# Patient Record
Sex: FEMALE
[De-identification: names, ages, dates, MRNs, and addresses within clinical notes are randomized per-mention and may not be internally consistent; named-entity substitution may affect disease eponyms.]

---

## 2020-03-12 ENCOUNTER — HOSPITAL ENCOUNTER (EMERGENCY)
Dept: HOSPITAL 88 - ER | Age: 25
Discharge: HOME | End: 2020-03-12
Payer: COMMERCIAL

## 2020-03-12 VITALS — DIASTOLIC BLOOD PRESSURE: 68 MMHG | SYSTOLIC BLOOD PRESSURE: 104 MMHG

## 2020-03-12 VITALS — WEIGHT: 140 LBS | BODY MASS INDEX: 26.43 KG/M2 | HEIGHT: 61 IN

## 2020-03-12 DIAGNOSIS — R10.30: Primary | ICD-10-CM

## 2020-03-12 DIAGNOSIS — R19.7: ICD-10-CM

## 2020-03-12 DIAGNOSIS — R11.2: ICD-10-CM

## 2020-03-12 LAB
ALBUMIN SERPL-MCNC: 4.1 G/DL (ref 3.5–5)
ALBUMIN/GLOB SERPL: 1.2 {RATIO} (ref 0.8–2)
ALP SERPL-CCNC: 113 IU/L (ref 40–150)
ALT SERPL-CCNC: 19 IU/L (ref 0–55)
ANION GAP SERPL CALC-SCNC: 10.6 MMOL/L (ref 8–16)
BACTERIA URNS QL MICRO: (no result) /HPF
BASOPHILS # BLD AUTO: 0.1 10*3/UL (ref 0–0.1)
BASOPHILS NFR BLD AUTO: 0.4 % (ref 0–1)
BILIRUB UR QL: NEGATIVE
BUN SERPL-MCNC: 10 MG/DL (ref 7–26)
BUN/CREAT SERPL: 14 (ref 6–25)
CALCIUM SERPL-MCNC: 9.2 MG/DL (ref 8.4–10.2)
CHLORIDE SERPL-SCNC: 106 MMOL/L (ref 98–107)
CLARITY UR: (no result)
CO2 SERPL-SCNC: 24 MMOL/L (ref 22–29)
COLOR UR: YELLOW
DEPRECATED NEUTROPHILS # BLD AUTO: 8.8 10*3/UL (ref 2.1–6.9)
DEPRECATED RBC URNS MANUAL-ACNC: (no result) /HPF (ref 0–5)
EGFRCR SERPLBLD CKD-EPI 2021: > 60 ML/MIN (ref 60–?)
EOSINOPHIL # BLD AUTO: 0.5 10*3/UL (ref 0–0.4)
EOSINOPHIL NFR BLD AUTO: 3.7 % (ref 0–6)
EPI CELLS URNS QL MICRO: (no result) /LPF
ERYTHROCYTE [DISTWIDTH] IN CORD BLOOD: 13.7 % (ref 11.7–14.4)
GLOBULIN PLAS-MCNC: 3.3 G/DL (ref 2.3–3.5)
GLUCOSE SERPLBLD-MCNC: 100 MG/DL (ref 74–118)
HCG UR QL: NEGATIVE
HCT VFR BLD AUTO: 39 % (ref 34.2–44.1)
HGB BLD-MCNC: 12.8 G/DL (ref 12–16)
KETONES UR QL STRIP.AUTO: NEGATIVE
LEUKOCYTE ESTERASE UR QL STRIP.AUTO: NEGATIVE
LIPASE SERPL-CCNC: 11 U/L (ref 8–78)
LYMPHOCYTES # BLD: 3.1 10*3/UL (ref 1–3.2)
LYMPHOCYTES NFR BLD AUTO: 23.5 % (ref 18–39.1)
MCH RBC QN AUTO: 29.2 PG (ref 28–32)
MCHC RBC AUTO-ENTMCNC: 32.8 G/DL (ref 31–35)
MCV RBC AUTO: 88.8 FL (ref 81–99)
MONOCYTES # BLD AUTO: 0.8 10*3/UL (ref 0.2–0.8)
MONOCYTES NFR BLD AUTO: 5.7 % (ref 4.4–11.3)
NEUTS SEG NFR BLD AUTO: 66.2 % (ref 38.7–80)
NITRITE UR QL STRIP.AUTO: NEGATIVE
PLATELET # BLD AUTO: 458 X10E3/UL (ref 140–360)
POTASSIUM SERPL-SCNC: 3.6 MMOL/L (ref 3.5–5.1)
PROT UR QL STRIP.AUTO: NEGATIVE
RBC # BLD AUTO: 4.39 X10E6/UL (ref 3.6–5.1)
SODIUM SERPL-SCNC: 137 MMOL/L (ref 136–145)
SP GR UR STRIP: 1.03 (ref 1.01–1.02)
UROBILINOGEN UR STRIP-MCNC: 0.2 MG/DL (ref 0.2–1)
WBC #/AREA URNS HPF: (no result) /HPF (ref 0–5)

## 2020-03-12 PROCEDURE — 83690 ASSAY OF LIPASE: CPT

## 2020-03-12 PROCEDURE — 36415 COLL VENOUS BLD VENIPUNCTURE: CPT

## 2020-03-12 PROCEDURE — 81025 URINE PREGNANCY TEST: CPT

## 2020-03-12 PROCEDURE — 99284 EMERGENCY DEPT VISIT MOD MDM: CPT

## 2020-03-12 PROCEDURE — 85025 COMPLETE CBC W/AUTO DIFF WBC: CPT

## 2020-03-12 PROCEDURE — 81001 URINALYSIS AUTO W/SCOPE: CPT

## 2020-03-12 PROCEDURE — 80053 COMPREHEN METABOLIC PANEL: CPT

## 2020-03-12 PROCEDURE — 74177 CT ABD & PELVIS W/CONTRAST: CPT

## 2020-03-12 NOTE — XMS REPORT
Patient Summary Document

                             Created on: 2020



GISSEL CONTEH

External Reference #: [id number]

: 1995

Sex: Female



Demographics







                          Address                   [address] [place] [address] [place]  [address]

 

                          Home Phone                [phone]

 

                          Preferred Language        Unknown

 

                          Marital Status            Unknown

 

                          Congregational Affiliation     Unknown

 

                          Race                      Unknown

 

                                        Additional Race(s)  

 

                          Ethnic Group              Unknown





Author







                          [facility]                    [facility]

 

                          [facility]

 

                          Address                   Unknown

 

                          Phone                     Unavailable







Support







                Name            Relationship    Address         Phone

 

                    MINA CONTEH       PRS                 [id number]

[place], [place]  [id number] [phone]

 

                    OTILIO ANDRE    PRS                 [address]

[address]

[place], [place]  [address] [phone]







Care Team Providers







                    Care Team Member Name    Role                Phone

 

                          Unavailable               Unavailable







Payers







             Payer Name    Policy Type    Policy Number    Effective Date    Expiration Date







Problems

This patient has no known problems.



Allergies, Adverse Reactions, Alerts







          Allergy Name    Allergy Type    Status    Severity    Reaction(s)    Onset Date    Inactive 

Date                      Treating Clinician        Comments

 

        No Known Allergies    DA      Active    U               2019 00:00:00                     

 

        No Known Allergies    DA      Active    U               2016 00:00:00                     







Medications

This patient has no known medications.



Results







           Test Description    Test Time    Test Comments    Text Results    Atomic Results    Result

 Comments

 

                URINALYSIS COMPLETE    2019 21:32:00                      

 

   

 

                UA COLOR (test code=COLU)    YELLOW DESCRIPT    YELLOW           

 

                UA APPEARANCE (test code=APPU)    HAZY DESCRIPT    CLEAR            

 

                UA GLUCOSE DIPSTICK (test code=DGLUU)    NEGATIVE (0) mg/dL    (NEG) 0          

 

                UA BILIRUBIN DIPSTICK (test code=BILU)    NEGATIVE (0) mg/dL    (NEG) 0          

 

                UA KETONE DIPSTICK (test code=KETU)    0 (NEG) mg/dL    (NEG) 0          

 

                UA SPECIFIC GRAVITY (test code=SGU)    1.025 SG        1.001-1.035      

 

                UA BLOOD DIPSTICK (test code=MAXIMILIANO)    3+ mg/DL        (NEG) 0          

 

                UA PH DIPSTICK (test code=MARIANA)    6.0 pH UNITS    4.6-8.0          

 

                UA PROTEIN DIPSTICK (test code=PROU)    NEGATIVE (0) mg/dL    <30 (1+)         

 

                UA UROBILINIOGEN DIPSTICK (test code=URO)    NORMAL (0) mg/Dl    <2.0 (1+)        

 

                UA NITRITE DIPSTICK (test code=LOULOU)    NEGATIVE (0) SCREEN    NEG              

 

                UA LEUKOCYTE ESTERASE DIPSTICK (test code=LEUU)    25(TR) Leuk/mcL    (NEG) 0          

 

                UA WBC (test code=WBCU)    0-3 #WBC/HPF    0-3              

 

                UA RBC (test code=RBCU)     #RBC/HPF    0-3              

 

                UA BACTERIA (test code=BACU)    FEW >1 /HPF     NONE-FEW         

 

                UA SQUAMOUS CELLS (test code=SQU)    FEW >2 /HPF     NONE-SQepi       

 

                UA HYALINE CAST (test code=HYALU)    0-5 #/LPF       0-3              

 

                UA MUCUS (test code=MUCU)    MANY /LPF       NONE             





UR HCG [id number] 21:32:00* 





                Test Item       Value           Reference Range    Comments

 

                UR HCG QUAL (test code=HCGQLU)    NEGATIVE        NEG             Very dilute urines with a low specific

 gravity may notcontain representative levels of hCG.





URINALYSIS [id number] 21:28:00* 





                Test Item       Value           Reference Range    Comments

 

                UA COLOR (test code=COLU)     DESCRIPT       YELLOW           

 

                UA APPEARANCE (test code=APPU)     DESCRIPT       CLEAR            

 

                UA GLUCOSE DIPSTICK (test code=DGLUU)     mg/dL          (NEG) 0          

 

                UA BILIRUBIN DIPSTICK (test code=BILU)     mg/dL          (NEG) 0          

 

                UA KETONE DIPSTICK (test code=KETU)     mg/dL          (NEG) 0          

 

                UA SPECIFIC GRAVITY (test code=SGU)     SG             1.001-1.035      

 

                UA BLOOD DIPSTICK (test code=MAXIMILIANO)     mg/DL          (NEG) 0          

 

                UA PH DIPSTICK (test code=MARIANA)     pH UNITS       4.6-8.0          

 

                UA PROTEIN DIPSTICK (test code=PROU)     mg/dL          <30 (1+)         

 

                UA UROBILINIOGEN DIPSTICK (test code=URO)     mg/Dl          <2.0 (1+)        

 

                UA NITRITE DIPSTICK (test code=LOULOU)     SCREEN         NEG              

 

                UA LEUKOCYTE ESTERASE DIPSTICK (test code=LEUU)     Leuk/mcL       (NEG) 0          

 

                UA RBC (test code=RBCU)     #RBC/HPF       0-3              





UR HCG [id number] 21:28:00* 





                Test Item       Value           Reference Range    Comments

 

                UR HCG QUAL (test code=HCGQLU)    NEGATIVE        NEG             Very dilute urines with a low specific

 gravity may notcontain representative levels of hCG.





CBC W/AUTO [id number] 20:08:00* 





                Test Item       Value           Reference Range    Comments

 

                WHITE BLOOD CELL (test code=WBC)    8.7 K/mm3       4.1-12.1         

 

                RED BLOOD CELL (test code=RBC)    4.51 M/mm3      3.8-5.5          

 

                HEMOGLOBIN (test code=HGB)    14.3 G/DL       10.6-15.8        

 

                HEMATOCRIT (test code=HCT)    42.4 %          31.8-47.4        

 

                MEAN CELL VOLUME (test code=MCV)    94.0 fL         80.1-101.1       

 

                MEAN CELL HGB (test code=MCH)    31.7 pg         25.3-35.3        

 

                MEAN CELL HGB CONCETRATION (test code=MCHC)    33.7 G/DL       32.7-35.1        

 

                RED CELL DISTRIBUTION WIDTH (test code=RDW)    12.0 %          12.2-16.4        

 

                RED CELL DISTRIBUTION WIDTH (test code=RDW-SD)    41.8 fL         36.4-46.3        

 

                PLATELET COUNT (test code=PLT)    401 K/mm3       155-337          

 

                MEAN PLATELET VOLUME (test code=MPV)    10.2 fL         6.8-11.2         

 

                GRANULOCYTE % (test code=GR%)    62.0 %          37.8-82.6        

 

                IMMATURE GRANULOCYTE % (test code=IG%)    0.2 %           0.0-2.0          

 

                LYMPHOCYTE % (test code=LY%)    27.6 %          14.1-45.4        

 

                MONOCYTE % (test code=MO%)    5.7 %           2.5-11.7         

 

                EOSINOPHIL % (test code=EO%)    4.2 %           0.0-6.2          

 

                BASOPHIL % (test code=BA%)    0.3 %           0.0-2.1          

 

                NUCLEATED RBC % (test code=NRBC%)    0.0 /100WBC%    0.0-1.0          

 

                GRANULOCYTE # (test code=GR#)    5.37 k/mm3      2.0-13.7         

 

                IMMATURE GRANULOCYTE # (test code=IG#)    0.02 K/mm3      0.00-0.03        

 

                LYMPHOCYTE # (test code=LY#)    2.39 K/mm3      0.6-3.8          

 

                MONOCYTE # (test code=MO#)    0.49 K/mm3      0.11-0.59        

 

                EOSINOPHIL # (test code=EO#)    0.36 K/mm3      0.0-0.4          

 

                BASOPHIL # (test code=BA#)    0.03 K/mm3      0.0-0.1          

 

                NUCLEATED RBC # (test code=NRBC#)    0.00 K/mm3      0.0-0.05

## 2020-03-12 NOTE — DIAGNOSTIC IMAGING REPORT
EXAM: CT Abdomen and Pelvis WITH intravenous contrast  



INDICATION: Abdominal pain



COMPARISON: None



TECHNIQUE: Abdomen and pelvis were scanned utilizing a multidetector helical

scanner from the lung base to the pubic symphysis after administration of IV

contrast. Coronal and sagittal reformations were obtained. Routine protocol was

performed. Scan was performed during portal venous phase.

     

IV CONTRAST: 100mL of Isovue 370

ORAL CONTRAST: Water



RADIATION DOSE:

Total DLP:  687 mGy*cm



Dose modulation, iterative reconstruction, and/or weight based adjustment of

the mA/kV was utilized to reduce the radiation dose to as low as reasonably

achievable. 



FINDINGS:

LOWER THORAX: Normal.



HEPATOBILIARY: No focal liver lesion. No biliary ductal dilation. Septations in

the otherwise normal gallbladder.



SPLEEN: No splenomegaly. Accessory splenules.



PANCREAS: The visualized portions of the pancreas demonstrate no focal mass or

ductal dilation. Pancreatic tail is absent.



ADRENALS: No adrenal nodules.

KIDNEYS/URETERS: No hydronephrosis, stones, or solid mass lesions.

PELVIC ORGANS/BLADDER: Unremarkable.



PERITONEUM / RETROPERITONEUM: No free air or fluid.

LYMPH NODES: No lymphadenopathy.

VESSELS: Unremarkable.



GI TRACT: No abnormal bowel thickening. No bowel obstruction. Normal appendix.



BONES AND SOFT TISSUES: No acute osseous injury. No suspicious lytic or blastic

lesions.



IMPRESSION: 

No acute findings in the abdomen or pelvis.



Signed by: Martita Singh MD on 3/12/2020 9:18 AM

## 2020-03-12 NOTE — NUR
educated patient on need for urine sample. Told her to press the call bell when 
she could provide a sample. Patient verbalized understanding.